# Patient Record
Sex: FEMALE | Race: WHITE | NOT HISPANIC OR LATINO | Employment: FULL TIME | URBAN - METROPOLITAN AREA
[De-identification: names, ages, dates, MRNs, and addresses within clinical notes are randomized per-mention and may not be internally consistent; named-entity substitution may affect disease eponyms.]

---

## 2017-07-12 ENCOUNTER — TELEPHONE (OUTPATIENT)
Dept: MEDICAL GROUP | Age: 27
End: 2017-07-12

## 2017-07-12 RX ORDER — M-VIT,TX,IRON,MINS/CALC/FOLIC 27MG-0.4MG
1 TABLET ORAL DAILY
COMMUNITY

## 2017-07-12 NOTE — TELEPHONE ENCOUNTER
NEW PATIENT VISIT PRE-VISIT PLANNING    1.  EpicCare Patient is checked in Patient Demographics? YES    2.  Immunizations were updated in Epic using WebIZ?: No WebIZ record       •  Web Iz Recommendations:     3.  Is this appointment scheduled as a Hospital Follow-Up? No    4.  Patient is due for the following Health Maintenance Topics:   There are no preventive care reminders to display for this patient.        5.  Reviewed/Updated the following with patient:       •   Preferred Pharmacy? YES       •   Preferred Lab? YES       •   Medications? YES. Was Abstract Encounter opened and chart updated? YES       •   Social History? YES. Was Abstract Encounter opened and chart updated? YES       •   Family History? YES. Was Abstract Encounter opened and chart updated? YES    6.  Updated Care Team?       •   DME Company (gait device, O2, CPAP, etc.) N\A       •   Other Specialists (eye doctor, derm, GYN, cardiology, endo, etc): NO    7.  Patient was informed to arrive 15 min prior to their scheduled appointment and bring in their medication bottles.

## 2017-07-12 NOTE — TELEPHONE ENCOUNTER
Phone Number Called: [phone] (home)       Message: left message for patient to call regarding PVP.    Left Message for patient to call back: yes

## 2017-07-13 ENCOUNTER — OFFICE VISIT (OUTPATIENT)
Dept: MEDICAL GROUP | Age: 27
End: 2017-07-13
Payer: COMMERCIAL

## 2017-07-13 VITALS
SYSTOLIC BLOOD PRESSURE: 112 MMHG | OXYGEN SATURATION: 98 % | BODY MASS INDEX: 29.23 KG/M2 | DIASTOLIC BLOOD PRESSURE: 70 MMHG | HEIGHT: 63 IN | WEIGHT: 165 LBS | TEMPERATURE: 98.4 F | HEART RATE: 87 BPM

## 2017-07-13 DIAGNOSIS — Z23 NEED FOR HPV VACCINATION: ICD-10-CM

## 2017-07-13 DIAGNOSIS — E78.2 MIXED HYPERLIPIDEMIA: ICD-10-CM

## 2017-07-13 PROCEDURE — 90471 IMMUNIZATION ADMIN: CPT | Performed by: INTERNAL MEDICINE

## 2017-07-13 PROCEDURE — 99204 OFFICE O/P NEW MOD 45 MIN: CPT | Mod: 25 | Performed by: INTERNAL MEDICINE

## 2017-07-13 PROCEDURE — 90651 9VHPV VACCINE 2/3 DOSE IM: CPT | Performed by: INTERNAL MEDICINE

## 2017-07-13 ASSESSMENT — ENCOUNTER SYMPTOMS
MUSCULOSKELETAL NEGATIVE: 1
NEUROLOGICAL NEGATIVE: 1
EYES NEGATIVE: 1
PSYCHIATRIC NEGATIVE: 1
GASTROINTESTINAL NEGATIVE: 1
CONSTITUTIONAL NEGATIVE: 1
CARDIOVASCULAR NEGATIVE: 1
RESPIRATORY NEGATIVE: 1

## 2017-07-13 ASSESSMENT — PATIENT HEALTH QUESTIONNAIRE - PHQ9: CLINICAL INTERPRETATION OF PHQ2 SCORE: 0

## 2017-07-13 NOTE — MR AVS SNAPSHOT
"        Elvira Sanchez   2017 10:20 AM   Office Visit   MRN: [id number]    Department:  [facility]   Dept Phone:  [phone]    Description:  Female : 1990   Provider:  Jerod Buenrostro M.D.           Reason for Visit     Establish Care           Allergies as of 2017     No Known Allergies      You were diagnosed with     BMI 29.0-29.9,adult   [712942]       Need for HPV vaccination   [100385]       Mixed hyperlipidemia   [272.2.ICD-9-CM]         Vital Signs     Blood Pressure Pulse Temperature Height Weight Body Mass Index    112/70 mmHg 87 36.9 °C (98.4 °F) 1.6 m (5' 3\") 74.844 kg (165 lb) 29.24 kg/m2    Oxygen Saturation Breastfeeding? Smoking Status             98% No Never Smoker          Basic Information     Date Of Birth Sex Preferred Language          1990 Female English        Problem List              ICD-10-CM Priority Class Noted - Resolved    BMI 29.0-29.9,adult Z68.29   2017 - Present      Health Maintenance        Date Due Completion Dates    IMM HEP B VACCINE (1 of 3 - Primary Series) 1990 ---    IMM HEP A VACCINE (1 of 2 - Standard Series) 8/10/1993 ---    IMM HPV VACCINE (1 of 3 - Female 3 Dose Series) 8/10/2003 ---    IMM VARICELLA (CHICKENPOX) VACCINE (1 of 2 - 2 Dose Adolescent Series) 8/10/2005 ---    IMM DTaP/Tdap/Td Vaccine (1 - Tdap) 8/10/2011 ---    PAP SMEAR 8/10/2013 ---    IMM INFLUENZA (1) 2017 ---            Current Immunizations     HPV 9-VALENT VACCINE (GARDASIL 9)  Incomplete      Below and/or attached are the medications your provider expects you to take. Review all of your home medications and newly ordered medications with your provider and/or pharmacist. Follow medication instructions as directed by your provider and/or pharmacist. Please keep your medication list with you and share with your provider. Update the information when medications are discontinued, doses are changed, or new medications (including over-the-counter products) " are added; and carry medication information at all times in the event of emergency situations     Allergies:  No Known Allergies          Medications  Valid as of: July 13, 2017 - 11:10 AM    Generic Name Brand Name Tablet Size Instructions for use    Evening Primrose Oil   Take  by mouth.        Glucosamine-Chondroit-Vit C-Mn   Take  by mouth.        Multiple Vitamins-Minerals (Tab) THERAGRAN-M  Take 1 Tab by mouth every day.        Phentermine HCl (TABLET DISPERSIBLE) Phentermine HCl 30 MG Take 1 Each by mouth every day.        .                 Medicines prescribed today were sent to:     [facility] [address] [place] [address] - [address] [facility]    [address] [facility] [address]    Phone: [phone] Fax: [phone]    Open 24 Hours?: No      Medication refill instructions:       If your prescription bottle indicates you have medication refills left, it is not necessary to call your provider’s office. Please contact your pharmacy and they will refill your medication.    If your prescription bottle indicates you do not have any refills left, you may request refills at any time through one of the following ways: The online Quickflix system (except Urgent Care), by calling your provider’s office, or by asking your pharmacy to contact your provider’s office with a refill request. Medication refills are processed only during regular business hours and may not be available until the next business day. Your provider may request additional information or to have a follow-up visit with you prior to refilling your medication.   *Please Note: Medication refills are assigned a new Rx number when refilled electronically. Your pharmacy may indicate that no refills were authorized even though a new prescription for the same medication is available at the pharmacy. Please request the medicine by name with the pharmacy before contacting your provider for a refill.        Your To Do List     Future Labs/Procedures Complete  By Expires    CBC WITH DIFFERENTIAL  As directed 7/13/2018    COMP METABOLIC PANEL  As directed 7/13/2018    COMP METABOLIC PANEL  As directed 7/13/2018    LIPID PROFILE  As directed 7/13/2018    TSH  As directed 7/13/2018         Sabirmedical Access Code: [email or web address]  Expires: 8/12/2017  9:51 AM    [employer]  A secure, online tool to manage your health information     The [employer] Sabirmedical® is a secure, online tool that connects you to your personalized health information from the privacy of your home -- day or night - making it very easy for you to manage your healthcare. Once the activation process is completed, you can even access your medical information using the Sabirmedical jerome, which is available for free in the Apple Jerome store or Google Play store.     Sabirmedical provides the following levels of access (as shown below):   My Chart Features   [facility] Doctor [facility]  Urgent  Care Non-[facility]  Primary Care  Doctor   Email your healthcare team securely and privately 24/7 X X X    Manage appointments: schedule your next appointment; view details of past/upcoming appointments X      Request prescription refills. X      View recent personal medical records, including lab and immunizations X X X X   View health record, including health history, allergies, medications X X X X   Read reports about your outpatient visits, procedures, consult and ER notes X X X X   See your discharge summary, which is a recap of your hospital and/or ER visit that includes your diagnosis, lab results, and care plan. X X       How to register for Sabirmedical:  1. Go to  [email or web address].  2. Click on the Sign Up Now box, which takes you to the New Member Sign Up page. You will need to provide the following information:  a. Enter your Sabirmedical Access Code exactly as it appears at the top of this page. (You will not need to use this code after you’ve completed the sign-up process. If you do not sign up before  the expiration date, you must request a new code.)   b. Enter your date of birth.   c. Enter your home email address.   d. Click Submit, and follow the next screen’s instructions.  3. Create a Navigating Cancer ID. This will be your Navigating Cancer login ID and cannot be changed, so think of one that is secure and easy to remember.  4. Create a Aston[employer] password. You can change your password at any time.  5. Enter your Password Reset Question and Answer. This can be used at a later time if you forget your password.   6. Enter your e-mail address. This allows you to receive e-mail notifications when new information is available in Navigating Cancer.  7. Click Sign Up. You can now view your health information.    For assistance activating your Navigating Cancer account, call [phone]

## 2017-07-13 NOTE — PROGRESS NOTES
"Subjective:      Elvira Sanchez is a 26 y.o. female who presents with Establish Care   this is a new patient to me who is self-referred for evaluation of weight gain and concerns about trying to lose weight effectively. She's gained several pounds in the last few years gradually. She is mentioned trying some educ nausea vomiting or history of binge disorder. Is currently on no medications other than over-the-counter supplements. Is due for her third HPV vaccine which she got that first 2 at [facility] in [place].    She is interested in medications or supplements to assist with weight reduction. She's been athletic all her life and played soccer in high school competitively. She's been under some increased stress recently which she trips to increase calorie consumption. She has no heat or cold intolerance chest pain dyspnea. She is following a high-protein low-carb diet.    Medications none. Allergies none.    Social history patient recently  from her boyfriend but she feels quite relieved with this situation. She moved to [place] from [facility] a few years ago to be closer to her brother and all of the highly congested, [place] area. 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    Health maintenance-due for Pap smear and pelvic exam, he is screening and third HPV shot.    Family history was reviewed and no significant history uncovered.                  t       HPI    Review of Systems   Constitutional: Negative.    HENT: Negative.    Eyes: Negative.    Respiratory: Negative.    Cardiovascular: Negative.    Gastrointestinal: Negative.    Genitourinary: Negative.    Musculoskeletal: Negative.    Skin: Negative.    Neurological: Negative.    Endo/Heme/Allergies: Negative.    Psychiatric/Behavioral: Negative.           Objective:     /70 mmHg  Pulse 87  Temp(Src) 36.9 °C (98.4 °F)  Ht 1.6 m (5' 3\")  Wt 74.844 kg (165 lb)  BMI 29.24 kg/m2  SpO2 98%  " Breastfeeding? No     Physical Exam   Constitutional: She is oriented to person, place, and time. She appears well-developed and well-nourished.   HENT:   Head: Normocephalic and atraumatic.   Right Ear: External ear normal.   Left Ear: External ear normal.   Nose: Nose normal.   Mouth/Throat: Oropharynx is clear and moist.   Eyes: Conjunctivae and EOM are normal. Pupils are equal, round, and reactive to light. Right eye exhibits no discharge. Left eye exhibits no discharge. No scleral icterus.   Neck: Normal range of motion. Neck supple. No JVD present. No tracheal deviation present. No thyromegaly present.   Cardiovascular: Normal rate, regular rhythm, normal heart sounds and intact distal pulses.  Exam reveals no gallop and no friction rub.    Pulmonary/Chest: Effort normal and breath sounds normal. No stridor. No respiratory distress. She has no wheezes. She has no rales. She exhibits no tenderness.   Abdominal: Soft. Bowel sounds are normal. She exhibits no distension and no mass. There is no tenderness. There is no rebound and no guarding. No hernia.   Musculoskeletal: Normal range of motion. She exhibits no edema or tenderness.   Lymphadenopathy:     She has no cervical adenopathy.   Neurological: She is alert and oriented to person, place, and time. She has normal reflexes. She displays normal reflexes. No cranial nerve deficit. Coordination normal.   Skin: Skin is warm and dry. No rash noted. No erythema. No pallor.   Psychiatric: She has a normal mood and affect. Her behavior is normal. Judgment and thought content normal.   Nursing note and vitals reviewed.    No results found for any previous visit.   No results found for: HBA1C  No results found for: SODIUM, POTASSIUM, CHLORIDE, CO2, GLUCOSE, BUN, CREATININE, BUNCREATRAT, GLOMRATE, ALKPHOSPHAT, ASTSGOT, ALTSGPT, TBILIRUBIN, ALB  No results found for: INR  No results found for: CHOLSTRLTOT, LDL, HDL, TRIGLYCERIDE    No results found for: TESTOSTERONE  No  results found for: TSH  No results found for: FREET4  No results found for: URICACID  No components found for: VITB12  No results found for: 25HYDROXY              Assessment/Plan:     1. BMI 29.0-29.9,adult-counseling given. Diet and exercise plan outlined. Recommended routine telemetry reduction of the next 3 months with high protein low-carb diet. Rule out thyroid disorder. Recheck her labs.  - TSH; Future  - COMP METABOLIC PANEL; Future  - LIPID PROFILE; Future  - CBC WITH DIFFERENTIAL; Future  - Phentermine HCl 30 MG TABLET DISPERSIBLE; Take 1 Each by mouth every day.  Dispense: 90 Tab; Refill: 0    2. Need for HPV vaccination      - GARDASIL 9    3. Mixed hyperlipidemia         - COMP METABOLIC PANEL; Future     45 minute face-to-face encounter took place today.  More than half of this time was spent in the coordination of care of the above problems, as well as counseling.

## 2017-07-14 ENCOUNTER — HOSPITAL ENCOUNTER (OUTPATIENT)
Dept: LAB | Facility: MEDICAL CENTER | Age: 27
End: 2017-07-14
Attending: INTERNAL MEDICINE
Payer: COMMERCIAL

## 2017-07-14 LAB
ALBUMIN SERPL BCP-MCNC: 4.5 G/DL (ref 3.2–4.9)
ALBUMIN/GLOB SERPL: 1.5 G/DL
ALP SERPL-CCNC: 47 U/L (ref 30–99)
ALT SERPL-CCNC: 27 U/L (ref 2–50)
ANION GAP SERPL CALC-SCNC: 9 MMOL/L (ref 0–11.9)
AST SERPL-CCNC: 23 U/L (ref 12–45)
BASOPHILS # BLD AUTO: 0.4 % (ref 0–1.8)
BASOPHILS # BLD: 0.03 K/UL (ref 0–0.12)
BILIRUB SERPL-MCNC: 0.4 MG/DL (ref 0.1–1.5)
BUN SERPL-MCNC: 19 MG/DL (ref 8–22)
CALCIUM SERPL-MCNC: 10.2 MG/DL (ref 8.5–10.5)
CHLORIDE SERPL-SCNC: 105 MMOL/L (ref 96–112)
CHOLEST SERPL-MCNC: 171 MG/DL (ref 100–199)
CO2 SERPL-SCNC: 21 MMOL/L (ref 20–33)
CREAT SERPL-MCNC: 0.84 MG/DL (ref 0.5–1.4)
EOSINOPHIL # BLD AUTO: 0.47 K/UL (ref 0–0.51)
EOSINOPHIL NFR BLD: 6.3 % (ref 0–6.9)
ERYTHROCYTE [DISTWIDTH] IN BLOOD BY AUTOMATED COUNT: 49.1 FL (ref 35.9–50)
GFR SERPL CREATININE-BSD FRML MDRD: >60 ML/MIN/1.73 M 2
GLOBULIN SER CALC-MCNC: 3 G/DL (ref 1.9–3.5)
GLUCOSE SERPL-MCNC: 57 MG/DL (ref 65–99)
HCT VFR BLD AUTO: 45.1 % (ref 37–47)
HDLC SERPL-MCNC: 49 MG/DL
HGB BLD-MCNC: 14 G/DL (ref 12–16)
IMM GRANULOCYTES # BLD AUTO: 0.03 K/UL (ref 0–0.11)
IMM GRANULOCYTES NFR BLD AUTO: 0.4 % (ref 0–0.9)
LDLC SERPL CALC-MCNC: 109 MG/DL
LYMPHOCYTES # BLD AUTO: 1.94 K/UL (ref 1–4.8)
LYMPHOCYTES NFR BLD: 25.8 % (ref 22–41)
MCH RBC QN AUTO: 30 PG (ref 27–33)
MCHC RBC AUTO-ENTMCNC: 31 G/DL (ref 33.6–35)
MCV RBC AUTO: 96.6 FL (ref 81.4–97.8)
MONOCYTES # BLD AUTO: 0.7 K/UL (ref 0–0.85)
MONOCYTES NFR BLD AUTO: 9.3 % (ref 0–13.4)
NEUTROPHILS # BLD AUTO: 4.34 K/UL (ref 2–7.15)
NEUTROPHILS NFR BLD: 57.8 % (ref 44–72)
NRBC # BLD AUTO: 0 K/UL
NRBC BLD AUTO-RTO: 0 /100 WBC
PLATELET # BLD AUTO: 240 K/UL (ref 164–446)
PMV BLD AUTO: 11.8 FL (ref 9–12.9)
POTASSIUM SERPL-SCNC: 3.8 MMOL/L (ref 3.6–5.5)
PROT SERPL-MCNC: 7.5 G/DL (ref 6–8.2)
RBC # BLD AUTO: 4.67 M/UL (ref 4.2–5.4)
SODIUM SERPL-SCNC: 135 MMOL/L (ref 135–145)
TRIGL SERPL-MCNC: 66 MG/DL (ref 0–149)
TSH SERPL DL<=0.005 MIU/L-ACNC: 2.89 UIU/ML (ref 0.3–3.7)
WBC # BLD AUTO: 7.5 K/UL (ref 4.8–10.8)

## 2017-07-14 PROCEDURE — 80061 LIPID PANEL: CPT

## 2017-07-14 PROCEDURE — 80053 COMPREHEN METABOLIC PANEL: CPT

## 2017-07-14 PROCEDURE — 84443 ASSAY THYROID STIM HORMONE: CPT

## 2017-07-14 PROCEDURE — 36415 COLL VENOUS BLD VENIPUNCTURE: CPT

## 2017-07-14 PROCEDURE — 85025 COMPLETE CBC W/AUTO DIFF WBC: CPT

## 2017-12-01 ENCOUNTER — HOSPITAL ENCOUNTER (OUTPATIENT)
Dept: LAB | Facility: MEDICAL CENTER | Age: 27
End: 2017-12-01
Attending: INTERNAL MEDICINE
Payer: COMMERCIAL

## 2017-12-01 ENCOUNTER — HOSPITAL ENCOUNTER (OUTPATIENT)
Facility: MEDICAL CENTER | Age: 27
End: 2017-12-01
Attending: DERMATOLOGY
Payer: COMMERCIAL

## 2017-12-01 PROCEDURE — 87389 HIV-1 AG W/HIV-1&-2 AB AG IA: CPT

## 2017-12-01 PROCEDURE — 36415 COLL VENOUS BLD VENIPUNCTURE: CPT

## 2017-12-01 PROCEDURE — 87389 HIV-1 AG W/HIV-1&-2 AB AG IA: CPT | Mod: 91

## 2017-12-01 PROCEDURE — 86706 HEP B SURFACE ANTIBODY: CPT

## 2017-12-01 PROCEDURE — 86803 HEPATITIS C AB TEST: CPT

## 2017-12-01 PROCEDURE — 86803 HEPATITIS C AB TEST: CPT | Mod: 91

## 2017-12-01 PROCEDURE — 86706 HEP B SURFACE ANTIBODY: CPT | Mod: 91

## 2018-03-05 LAB
HBV SURFACE AB SERPL IA-ACNC: >1000 MIU/ML (ref 0–10)
HBV SURFACE AB SERPL IA-ACNC: NORMAL MIU/ML (ref 0–10)
HCV AB SER QL: NEGATIVE
HCV AB SER QL: NORMAL
HIV 1+2 AB+HIV1 P24 AG SERPL QL IA: NON REACTIVE
HIV 1+2 AB+HIV1 P24 AG SERPL QL IA: NORMAL

## 2018-03-05 PROCEDURE — 36415 COLL VENOUS BLD VENIPUNCTURE: CPT

## 2018-03-05 PROCEDURE — 87389 HIV-1 AG W/HIV-1&-2 AB AG IA: CPT

## 2018-03-05 PROCEDURE — 86706 HEP B SURFACE ANTIBODY: CPT

## 2018-03-05 PROCEDURE — 86803 HEPATITIS C AB TEST: CPT
